# Patient Record
Sex: MALE | Race: WHITE | HISPANIC OR LATINO | URBAN - METROPOLITAN AREA
[De-identification: names, ages, dates, MRNs, and addresses within clinical notes are randomized per-mention and may not be internally consistent; named-entity substitution may affect disease eponyms.]

---

## 2022-11-25 ENCOUNTER — EMERGENCY (EMERGENCY)
Facility: HOSPITAL | Age: 22
LOS: 0 days | Discharge: HOME | End: 2022-11-25
Attending: STUDENT IN AN ORGANIZED HEALTH CARE EDUCATION/TRAINING PROGRAM | Admitting: STUDENT IN AN ORGANIZED HEALTH CARE EDUCATION/TRAINING PROGRAM

## 2022-11-25 VITALS
HEART RATE: 68 BPM | OXYGEN SATURATION: 100 % | SYSTOLIC BLOOD PRESSURE: 134 MMHG | RESPIRATION RATE: 20 BRPM | TEMPERATURE: 98 F | DIASTOLIC BLOOD PRESSURE: 84 MMHG
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
## 2022-11-25 DIAGNOSIS — S61.032A PUNCTURE WOUND WITHOUT FOREIGN BODY OF LEFT THUMB WITHOUT DAMAGE TO NAIL, INITIAL ENCOUNTER: ICD-10-CM

## 2022-11-25 DIAGNOSIS — Z77.21 CONTACT WITH AND (SUSPECTED) EXPOSURE TO POTENTIALLY HAZARDOUS BODY FLUIDS: ICD-10-CM
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
## 2022-11-25 PROCEDURE — 99284 EMERGENCY DEPT VISIT MOD MDM: CPT

## 2022-11-25 PROCEDURE — 99053 MED SERV 10PM-8AM 24 HR FAC: CPT

## 2022-11-25 NOTE — ED PROVIDER NOTE - NSFOLLOWUPINSTRUCTIONS_ED_ALL_ED_FT
Follow Up with [facility] in the next week for your results.     Continue to follow up with [facility] and ID clinic.     Take your medications as prescribed.       Needlestick and Sharps Injury      A needlestick injury happens when a person is stuck with a needle or sharp tool (sharps) that may have someone else's blood on it. Needlestick injuries are most common among health care workers but can also happen to people in the community who are exposed to needles. A needlestick injury may expose a person to blood or body fluids that carry viruses that cause infection, such as:  •Hepatitis B virus.      •Hepatitis C virus.      •HIV (human immunodeficiency virus).        What are the causes?    This injury is caused by a needle or other sharp tool that punctures or cuts your skin. It can happen to people who are:  •Giving an injection.      •Drawing blood.      •Performing medical procedures with a needle or scalpel.      •Handling or throwing away used needles.      •Cleaning equipment or patient care areas.      This injury can also occur if you:  •Accidentally come into contact with a needle that was discarded in a public place.      •Share a needle or inject illegal drugs.        What increases the risk?    This injury is more likely to happen to health care workers who:  •Recap needles.      •Pass sharp objects to another person.      •Do not use or have access to needle safety devices.      •Feel pressure or a sense of urgency in the workplace when using needles or sharps.        What are the signs or symptoms?    Symptoms of this injury include:  •Pain or irritation at the injury site.      •Bleeding at the injury site.        How is this diagnosed?    This condition is diagnosed based on:  •A physical exam.      •How the injury happened.      Your health care provider may check the medical history of the person whose blood you were exposed to. That person's blood may need to be tested.    Tell your health care provider if you are pregnant or breastfeeding.      How is this treated?  A person having a blood sample taken from arm.   If you have a needle stick injury or think you may have been exposed to blood or body fluids:  •Wash the injured area right away with soap and water for at least 20 seconds.      •Place a bandage (dressing) or clean towel on the wound and apply gentle pressure to stop the bleeding. Do not squeeze or rub the area.    •Notify a workplace supervisor or health care provider right away. Then:  •Follow any procedures in your workplace, if applicable.      •Get treatment right away, if needed. Treatment must be started as soon as possible after the exposure.        •Keep your vaccinations up to date, if you are an adult. Adults are at risk for developing different diseases, and protection (immunity) from childhood vaccines can wear off over time.      Treatments may include:  •A tetanus booster shot. This shot may be given if you have not had a tetanus booster shot within the past 10 years.      •A hepatitis B vaccination.      •Blood tests. These may be recommended for up to 6 months after the injury to rule out infection.      •Medicines to prevent infection (postexposure prophylaxis).      •Wound care.        Follow these instructions at home:    Wound care   •There are many ways to close and cover a wound. For example, a wound can be covered with stitches (sutures), skin glue, or adhesive strips. Follow instructions from your health care provider about how to take care of your wound. Make sure you:  •Wash your hands with soap and water for at least 20 seconds before and after you change your dressing, if you have one. If soap and water are not available, use hand .      •Change your dressing as told by your health care provider.      •Leave stitches (sutures), skin glue, or adhesive strips in place. These skin closures may need to stay in place for 2 weeks or longer. If adhesive strip edges start to loosen and curl up, you may trim the loose edges. Do not remove adhesive strips completely unless your health care provider tells you to do that.        •Keep the dressing dry as told by your health care provider. Do not take baths, swim, use a hot tub, or do anything that would put your wound underwater until your health care provider approves.    •Check your wound every day for signs of infection. Check for:  •Redness, swelling, or pain.      •Fluid or blood.      •Warmth.      •Pus or a bad smell.        General instructions     •Take over-the-counter and prescription medicines only as told by your health care provider.      •If you were prescribed an antibiotic medicine, take it as told by your health care provider. Do not stop using the antibiotic even if you start to feel better.      •Keep all follow-up visits. This is important.        Contact a health care provider if:    •You have redness, swelling, or more bleeding or pain at the injury site.      •You have a fever or tiredness.      •You feel anxious, angry, or depressed.      •You have trouble sleeping.      •You feel generally sick (malaise) or develop infections often.      •Your skin or the white parts of your eyes turn yellow (jaundice).      •You have pain or a feeling of fullness in your abdomen.        Summary    •A needlestick injury happens when a person is stuck with a needle or sharp object that may have someone else's blood on it. The injury may expose the person to blood that carries viruses that cause infection.      •Treatment starts with cleaning the injured area right away with soap and water for at least 20 seconds.      •Treatment may also include a tetanus booster shot, a hepatitis B vaccine, and medicines to prevent infection.      This information is not intended to replace advice given to you by your health care provider. Make sure you discuss any questions you have with your health care provider.

## 2022-11-25 NOTE — ED PROVIDER NOTE - PHYSICAL EXAMINATION
CONSTITUTIONAL: nontoxic appearing, in no acute distress  HEAD:  normocephalic, atraumatic  EYES:  no conjunctival injection, no eye discharge, tracking well  ENT:  tympanic membranes intact bilaterally, moist mucous membranes, no oropharyngeal ulcerations or lesions, no tonsillar swelling or erythema, no tonsillar exudates  NECK:  supple, no masses, no tender anterior/posterior cervical lymphadenopathy  CV:  regular rate and rhythm, cap refill < 2 seconds  RESP:  normal respiratory effort, lungs clear to auscultation bilaterally, no wheezes, no crackles, no retractions, no stridor  ABD:  soft, nontender, nondistended, no masses, no organomegaly  LYMPH:  no significant lymphadenopathy  MSK/NEURO:  normal movement, normal tone; L thumb w/ puncture wound and dried blood; no edema  SKIN:  warm, dry, no rash

## 2022-11-25 NOTE — ED PROVIDER NOTE - CLINICAL SUMMARY MEDICAL DECISION MAKING FREE TEXT BOX
22-year-old male presenting today with needlestick injury.  Patient is hemodynamically stable and well-appearing on evaluation.  Patient started on PEP and labs were drawn.  Patient will have labs followed up by [facility].

## 2022-11-25 NOTE — ED PROVIDER NOTE - PATIENT PORTAL LINK FT
You can access the FollowMyHealth Patient Portal offered by [facility] by registering at the following website: [email or web address]. By joining [employer]’s FollowMyHealth portal, you will also be able to view your health information using other applications (apps) compatible with our system.
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
## 2024-11-14 ENCOUNTER — EMERGENCY (EMERGENCY)
Facility: HOSPITAL | Age: 24
LOS: 0 days | Discharge: HOME | End: 2024-11-14
Attending: STUDENT IN AN ORGANIZED HEALTH CARE EDUCATION/TRAINING PROGRAM
Payer: COMMERCIAL

## 2024-11-14 VITALS
SYSTOLIC BLOOD PRESSURE: 112 MMHG | TEMPERATURE: 98 F | HEART RATE: 91 BPM | RESPIRATION RATE: 19 BRPM | OXYGEN SATURATION: 99 % | DIASTOLIC BLOOD PRESSURE: 74 MMHG

## 2024-11-14 VITALS — WEIGHT: 160.06 LBS
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
## 2024-11-14 DIAGNOSIS — S10.91XA ABRASION OF UNSPECIFIED PART OF NECK, INITIAL ENCOUNTER: ICD-10-CM
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
## 2024-11-14 PROCEDURE — 99282 EMERGENCY DEPT VISIT SF MDM: CPT

## 2024-11-14 PROCEDURE — 99283 EMERGENCY DEPT VISIT LOW MDM: CPT
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
## 2024-11-14 NOTE — ED PROVIDER NOTE - NSFOLLOWUPINSTRUCTIONS_ED_ALL_ED_FT
Abrasion    WHAT YOU NEED TO KNOW:    What is an abrasion? An abrasion is a wound on your skin. Abrasions usually happen when your skin rubs against a rough surface. Examples of an abrasion include rug burn, a skinned elbow, or road rash. Abrasions can be deep or shallow The wound may hurt, bleed, bruise, or swell.    How can I care for my abrasion?    Wash your hands and dry them with a clean towel first.    Press a clean cloth against your wound for 5 to 10 minutes to stop any bleeding.    Rinse your wound with clean water. Do not use harsh soap, alcohol, or iodine solutions.    Use a clean, wet cloth to remove any objects, such as small pieces of rocks or dirt.    Rub antibiotic ointment on your wound. This may help prevent infection and help your wound heal.    Cover the wound with a non-stick bandage. Change the bandage daily, and if it gets wet or dirty.  When should I seek immediate care?    The bleeding does not stop after 10 minutes of firm pressure.    The redness around your wound begins to spread.    You cannot rinse one or more foreign objects out of your wound.  When should I call my doctor?    You have a fever or chills.    Your abrasion is red, warm, swollen, or draining pus.    You have questions or concerns about your condition or care.  CARE AGREEMENT:    You have the right to help plan your care. Learn about your health condition and how it may be treated. Discuss treatment options with your healthcare providers to decide what care you want to receive. You always have the right to refuse treatment.
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
## 2024-11-14 NOTE — ED PROVIDER NOTE - DIFFERENTIAL DIAGNOSIS
Differential Diagnosis differential dx includes but is not limited to:  abrasion. less likely laceration or blood-borne pathogen

## 2024-11-14 NOTE — ED PROVIDER NOTE - PATIENT PORTAL LINK FT
You can access the FollowMyHealth Patient Portal offered by [facility] by registering at the following website: [email or web address]. By joining [employer]’s FollowMyHealth portal, you will also be able to view your health information using other applications (apps) compatible with our system.
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
## 2024-11-14 NOTE — ED PROVIDER NOTE - PHYSICAL EXAMINATION
CONSTITUTIONAL: NAD  SKIN: Warm dry, + superficial linear abrasion to R neck  HEAD: NCAT  EYES: NL inspection  ENT: MMM  NECK: Supple; non tender.  CARD: RRR  RESP: normal work of breathing  NEURO: Grossly unremarkable  PSYCH: Cooperative, appropriate.